# Patient Record
Sex: FEMALE | Race: WHITE | NOT HISPANIC OR LATINO | URBAN - METROPOLITAN AREA
[De-identification: names, ages, dates, MRNs, and addresses within clinical notes are randomized per-mention and may not be internally consistent; named-entity substitution may affect disease eponyms.]

---

## 2022-03-10 ENCOUNTER — OFFICE VISIT (OUTPATIENT)
Dept: URBAN - METROPOLITAN AREA CLINIC 7 | Facility: CLINIC | Age: 62
End: 2022-03-10
Payer: COMMERCIAL

## 2022-03-10 DIAGNOSIS — Z96.1 PRESENCE OF PSEUDOPHAKIA: ICD-10-CM

## 2022-03-10 DIAGNOSIS — H34.8310 TRIB RTNL VEIN OCCLUSION, RIGHT EYE, WITH MACULAR EDEMA: ICD-10-CM

## 2022-03-10 DIAGNOSIS — H35.341 MACULAR HOLE OF RIGHT EYE: Primary | ICD-10-CM

## 2022-03-10 PROCEDURE — 99204 OFFICE O/P NEW MOD 45 MIN: CPT | Performed by: OPHTHALMOLOGY

## 2022-03-10 PROCEDURE — 92134 CPTRZ OPH DX IMG PST SGM RTA: CPT | Performed by: OPHTHALMOLOGY

## 2022-03-10 ASSESSMENT — INTRAOCULAR PRESSURE
OD: 12
OS: 12

## 2022-03-10 NOTE — IMPRESSION/PLAN
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 Plan: Exam/OCT reveals a small vessel BRVO with minimal ME involving superior macular region OD. Observe.

## 2022-03-10 NOTE — IMPRESSION/PLAN
Impression: Macular hole of right eye: H35.341. Plan: Exam/OCT reveals a full thickness macular hole. Discussed vitrectomy surgery to close macular hole. Reviewed risks associated with surgery including infection, bleeding, cataract formation, glaucoma, retinal detachment and loss of vision. Informed patient that vision and metamorphopsia generally improves with closure of the macular hole however some patients may have a persistent visual deficit. Patient elects to proceed with surgery. 

Plan: PPV, MP, GAS x MH OD

## 2022-03-30 ENCOUNTER — Encounter (OUTPATIENT)
Dept: URBAN - METROPOLITAN AREA EXTERNAL CLINIC 14 | Facility: EXTERNAL CLINIC | Age: 62
End: 2022-03-30
Payer: COMMERCIAL

## 2022-03-30 PROCEDURE — 67042 VIT FOR MACULAR HOLE: CPT | Performed by: OPHTHALMOLOGY

## 2022-05-19 ENCOUNTER — POST-OPERATIVE VISIT (OUTPATIENT)
Dept: URBAN - METROPOLITAN AREA CLINIC 7 | Facility: CLINIC | Age: 62
End: 2022-05-19
Payer: COMMERCIAL

## 2022-05-19 DIAGNOSIS — H35.341 MACULAR HOLE OF RIGHT EYE: Primary | ICD-10-CM

## 2022-05-19 PROCEDURE — 99024 POSTOP FOLLOW-UP VISIT: CPT | Performed by: OPHTHALMOLOGY

## 2022-05-19 PROCEDURE — 92134 CPTRZ OPH DX IMG PST SGM RTA: CPT | Performed by: OPHTHALMOLOGY

## 2022-05-19 ASSESSMENT — INTRAOCULAR PRESSURE
OD: 12
OS: 11

## 2022-05-19 NOTE — IMPRESSION/PLAN
Impression: S/P PPV, MP, GAS x MH OD - 50 Days. Macular hole of right eye  H35.341. Plan: OCT reveals MH has closed, however there is ME (prior hx of small vessel BRVO OD). Restart Prednisolone TID OD. Retina remains attached. IOP satisfactory OU. No infection. 

Return in 4 weesk, OCT OU, re-eval Avastin OD

## 2022-06-16 ENCOUNTER — POST-OPERATIVE VISIT (OUTPATIENT)
Dept: URBAN - METROPOLITAN AREA CLINIC 7 | Facility: CLINIC | Age: 62
End: 2022-06-16
Payer: COMMERCIAL

## 2022-06-16 DIAGNOSIS — H35.341 MACULAR HOLE OF RIGHT EYE: Primary | ICD-10-CM

## 2022-06-16 PROCEDURE — 99024 POSTOP FOLLOW-UP VISIT: CPT | Performed by: OPHTHALMOLOGY

## 2022-06-16 RX ORDER — PREDNISOLONE ACETATE 10 MG/ML
1 % SUSPENSION/ DROPS OPHTHALMIC
Qty: 5 | Refills: 3 | Status: INACTIVE
Start: 2022-06-16 | End: 2022-09-13

## 2022-06-16 ASSESSMENT — INTRAOCULAR PRESSURE
OS: 9
OD: 11

## 2022-06-16 NOTE — IMPRESSION/PLAN
Impression: S/P PPV, MP, GAS x MH OD - 78 Days. Macular hole of right eye  H35.341. Plan: OCT reveals MH has closed, however there is ME (prior hx of small vessel BRVO OD). Using Prednisolone TID OD. Exam/OCT reveals decreasing ME OD. Responding to topical steroid therapy. Recommend continuing Prednisolone TID OD. Retina remains attached. IOP satisfactory OU. No infection. 

Return in 6-8 weeks, OCT OU, re-eval Avastin OD